# Patient Record
(demographics unavailable — no encounter records)

---

## 2025-01-03 NOTE — PROCEDURE
[FreeTextEntry3] :  VPLASTY OF VENTRAL COLLAR CIRCUMCISION PENILE BLOCK [FreeTextEntry5] :  NONE [FreeTextEntry6] :  BANDAGE X 48 HRS BACITRACIN TID AFTER BANDAGE OFF X 2 DAYS THEN SWITCH TO VASELINE/AQUAPHOR X 1 MONTH BATHE 72 HRS FU 2-3 WEEKS (PHOTO OK)

## 2025-01-03 NOTE — CONSULT LETTER
[FreeTextEntry1] :   Dear Dr. EVE MARTÍNEZ,   Our mutual patient, JUANITA PURDY underwent surgery today as outlined below. The procedure went well and he was discharged from the PACU after an uneventful stay. Discharge instructions were provided in writing. Instructions regarding follow up were also provided.   Sincerely,   Terry Torres MD, FACS, FSPU Chief, Pediatric Urology Professor of Urology and Pediatrics [employer] at [facility].   President-elect, [employer]